# Patient Record
Sex: FEMALE | Race: OTHER | Employment: OTHER | URBAN - METROPOLITAN AREA
[De-identification: names, ages, dates, MRNs, and addresses within clinical notes are randomized per-mention and may not be internally consistent; named-entity substitution may affect disease eponyms.]

---

## 2018-01-05 PROBLEM — Z13.29 ENCOUNTER FOR SCREENING FOR ENDOCRINE DISORDER: Status: ACTIVE | Noted: 2018-01-05

## 2018-01-05 PROBLEM — Z00.00 MEDICARE ANNUAL WELLNESS VISIT, INITIAL: Status: ACTIVE | Noted: 2018-01-05

## 2018-01-05 PROBLEM — Z12.11 COLON CANCER SCREENING: Status: ACTIVE | Noted: 2018-01-05

## 2018-01-05 PROBLEM — K59.00 CONSTIPATION, UNSPECIFIED CONSTIPATION TYPE: Status: ACTIVE | Noted: 2018-01-05

## 2018-01-05 PROBLEM — E78.2 MIXED HYPERLIPIDEMIA: Status: ACTIVE | Noted: 2018-01-05

## 2018-01-17 PROBLEM — R19.4 CHANGE IN STOOL HABITS: Status: ACTIVE | Noted: 2018-01-17

## 2018-02-05 PROBLEM — Z12.31 VISIT FOR SCREENING MAMMOGRAM: Status: ACTIVE | Noted: 2018-02-05

## 2018-02-05 PROBLEM — Z78.0 POST-MENOPAUSAL: Status: ACTIVE | Noted: 2018-02-05

## 2018-02-26 PROBLEM — M25.511 ACUTE PAIN OF RIGHT SHOULDER: Status: ACTIVE | Noted: 2018-02-26

## 2018-02-26 PROBLEM — Z01.419 ENCOUNTER FOR WELL WOMAN EXAM WITH ROUTINE GYNECOLOGICAL EXAM: Status: ACTIVE | Noted: 2018-02-26

## 2018-03-07 PROBLEM — M75.41 IMPINGEMENT SYNDROME OF RIGHT SHOULDER: Status: ACTIVE | Noted: 2018-03-07

## 2018-03-16 PROBLEM — M54.2 CERVICALGIA: Status: ACTIVE | Noted: 2018-03-16

## 2019-02-06 PROBLEM — F41.9 ANXIETY: Status: ACTIVE | Noted: 2019-02-06

## 2019-02-06 PROBLEM — R19.4 CHANGE IN STOOL HABITS: Status: RESOLVED | Noted: 2018-01-17 | Resolved: 2019-02-06

## 2019-02-06 PROBLEM — M54.2 CERVICALGIA: Status: RESOLVED | Noted: 2018-03-16 | Resolved: 2019-02-06

## 2019-02-06 PROBLEM — M75.41 IMPINGEMENT SYNDROME OF RIGHT SHOULDER: Status: RESOLVED | Noted: 2018-03-07 | Resolved: 2019-02-06

## 2019-02-06 PROBLEM — M25.511 ACUTE PAIN OF RIGHT SHOULDER: Status: RESOLVED | Noted: 2018-02-26 | Resolved: 2019-02-06

## 2019-02-06 PROBLEM — K59.00 CONSTIPATION, UNSPECIFIED CONSTIPATION TYPE: Status: RESOLVED | Noted: 2018-01-05 | Resolved: 2019-02-06

## 2019-03-01 PROCEDURE — 88175 CYTOPATH C/V AUTO FLUID REDO: CPT | Performed by: OBSTETRICS & GYNECOLOGY

## 2019-03-06 PROBLEM — K40.90 RIGHT INGUINAL HERNIA: Status: ACTIVE | Noted: 2019-03-06

## 2019-03-06 PROBLEM — R73.9 HYPERGLYCEMIA: Status: ACTIVE | Noted: 2019-03-06

## 2019-11-05 PROBLEM — R73.01 IMPAIRED FASTING BLOOD SUGAR: Status: ACTIVE | Noted: 2019-11-05

## 2019-11-05 PROBLEM — R73.9 HYPERGLYCEMIA: Status: RESOLVED | Noted: 2019-03-06 | Resolved: 2019-11-05

## 2020-03-05 PROBLEM — Z78.0 POST-MENOPAUSE: Status: ACTIVE | Noted: 2018-02-05

## 2020-03-05 PROBLEM — Z00.00 MEDICARE ANNUAL WELLNESS VISIT, SUBSEQUENT: Status: ACTIVE | Noted: 2018-02-26

## 2020-09-03 PROBLEM — M25.511 CHRONIC RIGHT SHOULDER PAIN: Status: ACTIVE | Noted: 2020-09-03

## 2020-09-03 PROBLEM — G89.29 CHRONIC RIGHT SHOULDER PAIN: Status: ACTIVE | Noted: 2020-09-03

## 2020-09-03 PROBLEM — H61.21 CERUMEN DEBRIS ON TYMPANIC MEMBRANE OF RIGHT EAR: Status: ACTIVE | Noted: 2020-09-03

## 2020-09-23 PROBLEM — G95.20 SPINAL CORD COMPRESSION (HCC): Status: ACTIVE | Noted: 2020-09-23

## 2020-09-27 PROBLEM — E87.6 HYPOKALEMIA: Status: ACTIVE | Noted: 2020-09-27

## 2020-09-27 PROBLEM — R94.31 ABNORMAL ECG: Status: ACTIVE | Noted: 2020-09-27

## 2020-09-27 PROBLEM — R55 POSTURAL DIZZINESS WITH PRESYNCOPE: Status: ACTIVE | Noted: 2020-09-27

## 2020-09-27 PROBLEM — R93.89 ABNORMAL CHEST X-RAY: Status: ACTIVE | Noted: 2020-09-27

## 2020-09-27 PROBLEM — R42 POSTURAL DIZZINESS WITH PRESYNCOPE: Status: ACTIVE | Noted: 2020-09-27

## 2020-12-01 ENCOUNTER — OFFICE VISIT (OUTPATIENT)
Dept: SURGERY | Facility: CLINIC | Age: 70
End: 2020-12-01
Payer: MEDICARE

## 2020-12-01 VITALS — SYSTOLIC BLOOD PRESSURE: 140 MMHG | DIASTOLIC BLOOD PRESSURE: 70 MMHG | HEART RATE: 88 BPM

## 2020-12-01 DIAGNOSIS — G95.9 CERVICAL MYELOPATHY WITH CERVICAL RADICULOPATHY (HCC): Primary | ICD-10-CM

## 2020-12-01 DIAGNOSIS — R29.898 WEAKNESS OF BOTH LOWER EXTREMITIES: ICD-10-CM

## 2020-12-01 DIAGNOSIS — R26.9 NEUROLOGIC GAIT DYSFUNCTION: ICD-10-CM

## 2020-12-01 DIAGNOSIS — M54.12 CERVICAL MYELOPATHY WITH CERVICAL RADICULOPATHY (HCC): Primary | ICD-10-CM

## 2020-12-01 DIAGNOSIS — M47.816 LUMBAR SPONDYLOSIS: ICD-10-CM

## 2020-12-01 DIAGNOSIS — R29.898 WEAKNESS OF BOTH UPPER EXTREMITIES: ICD-10-CM

## 2020-12-01 DIAGNOSIS — D49.7 INTRADURAL EXTRAMEDULLARY SPINAL TUMOR: ICD-10-CM

## 2020-12-01 PROCEDURE — 3078F DIAST BP <80 MM HG: CPT | Performed by: PHYSICIAN ASSISTANT

## 2020-12-01 PROCEDURE — 3077F SYST BP >= 140 MM HG: CPT | Performed by: PHYSICIAN ASSISTANT

## 2020-12-01 PROCEDURE — 99205 OFFICE O/P NEW HI 60 MIN: CPT | Performed by: PHYSICIAN ASSISTANT

## 2020-12-01 NOTE — PATIENT INSTRUCTIONS
Refill policies:    • Allow 2-3 business days for refills; controlled substances may take longer.   • Contact your pharmacy at least 5 days prior to running out of medication and have them send an electronic request or submit request through the “request re Depending on your insurance carrier, approval may take 3-10 days. It is highly recommended patients contact their insurance carrier directly to determine coverage.   If test is done without insurance authorization, patient may be responsible for the entire without, MRI of the lumbar spine with and without gadolinium  Follow-up in 2 weeks  After imaging if the work-up is negative should benefit from a C3-4 laminectomy and tumor resection. Risk and benefits of surgery were discussed.   Patient was given ample

## 2020-12-01 NOTE — PROGRESS NOTES
Location of Pain: neck pain into shoulders bilaterally  and into the right arm. Pain is worse in the left shoulder and neck. Pt states she often drops things with her left hand.   Pt states she trips occasionally, and that she looses balance suddenly    D

## 2020-12-01 NOTE — PROGRESS NOTES
[facility] Neurosurgery Consultation      HISTORY OF PRESENT Brant Bradshaw is a 65year old  female who is primary Luxem[place]ish-speaking. She does speak some [place]. Her  here to translate for her they declined a .     She has a history HYSTERECTOMY         FAMILY HISTORY:  family history includes Diabetes in her sister; Hypertension in her sister. SOCIAL HISTORY:   reports that she has never smoked.  She has never used smokeless tobacco. She reports current alcohol use of about 1.0 sta associated dural tail or adjacent spinal cord edema. C3-C4: The intervertebral disc is normal. The right C3-C4 facet joint appears hypoplastic. C4-C5: A mild disc bulge and ligamentum flavum thickening cause mild central spinal canal stenosis.   Uncover [phone]

## 2020-12-02 ENCOUNTER — TELEPHONE (OUTPATIENT)
Dept: SURGERY | Facility: CLINIC | Age: 70
End: 2020-12-02

## 2020-12-15 ENCOUNTER — TELEPHONE (OUTPATIENT)
Dept: SURGERY | Facility: CLINIC | Age: 70
End: 2020-12-15

## 2020-12-15 DIAGNOSIS — R55 POSTURAL DIZZINESS WITH PRESYNCOPE: Primary | ICD-10-CM

## 2020-12-15 DIAGNOSIS — R42 POSTURAL DIZZINESS WITH PRESYNCOPE: Primary | ICD-10-CM

## 2020-12-15 NOTE — TELEPHONE ENCOUNTER
pt needs referrals to see Dr Devon Lockwood and Serena Dowling, please get referrals for pt, pt was in our office for appt and no referral in EPIC, pt asked to call DR Argentina Arellano office to get referral

## 2020-12-16 NOTE — TELEPHONE ENCOUNTER
Provider: Dr. Tariq Green and Lee Stauffer PA-C  Facility: [facility]  Address: Jeni Garay, [place], [address]  Phone: [phone]  Fax: [phone]  Routing to site for follow up. Thank you!

## 2020-12-18 PROBLEM — Q06.8: Status: ACTIVE | Noted: 2020-12-18

## 2020-12-21 PROBLEM — R93.2 ABNORMAL FINDING ON IMAGING OF LIVER: Status: ACTIVE | Noted: 2020-12-21

## 2020-12-21 PROBLEM — E87.6 HYPOKALEMIA: Status: RESOLVED | Noted: 2020-09-27 | Resolved: 2020-12-21

## 2020-12-21 PROBLEM — R93.7 ABNORMAL MAGNETIC RESONANCE IMAGING OF THORACIC SPINE: Status: ACTIVE | Noted: 2020-12-21

## 2020-12-21 PROBLEM — R93.89 ABNORMAL MRI, NECK: Status: ACTIVE | Noted: 2020-09-27

## 2020-12-21 PROBLEM — H61.21 CERUMEN DEBRIS ON TYMPANIC MEMBRANE OF RIGHT EAR: Status: RESOLVED | Noted: 2020-09-03 | Resolved: 2020-12-21

## 2021-01-14 PROBLEM — R10.11 RIGHT UPPER QUADRANT PAIN: Status: ACTIVE | Noted: 2021-01-14

## 2021-01-14 PROBLEM — K80.20 CALCULUS OF GALLBLADDER WITHOUT CHOLECYSTITIS WITHOUT OBSTRUCTION: Status: ACTIVE | Noted: 2021-01-14

## 2021-03-08 PROBLEM — R42 POSTURAL DIZZINESS WITH PRESYNCOPE: Status: RESOLVED | Noted: 2020-09-27 | Resolved: 2021-03-08

## 2021-03-08 PROBLEM — R55 POSTURAL DIZZINESS WITH PRESYNCOPE: Status: RESOLVED | Noted: 2020-09-27 | Resolved: 2021-03-08

## 2021-03-09 ENCOUNTER — OFFICE VISIT (OUTPATIENT)
Dept: SURGERY | Facility: CLINIC | Age: 71
End: 2021-03-09
Payer: MEDICARE

## 2021-03-09 VITALS
WEIGHT: 105 LBS | HEIGHT: 64 IN | SYSTOLIC BLOOD PRESSURE: 124 MMHG | DIASTOLIC BLOOD PRESSURE: 66 MMHG | HEART RATE: 62 BPM | BODY MASS INDEX: 17.93 KG/M2

## 2021-03-09 DIAGNOSIS — Q06.8: ICD-10-CM

## 2021-03-09 DIAGNOSIS — G95.20 SPINAL CORD COMPRESSION (HCC): Primary | ICD-10-CM

## 2021-03-09 PROCEDURE — 3008F BODY MASS INDEX DOCD: CPT | Performed by: NEUROLOGICAL SURGERY

## 2021-03-09 PROCEDURE — 3074F SYST BP LT 130 MM HG: CPT | Performed by: NEUROLOGICAL SURGERY

## 2021-03-09 PROCEDURE — 99214 OFFICE O/P EST MOD 30 MIN: CPT | Performed by: NEUROLOGICAL SURGERY

## 2021-03-09 PROCEDURE — 3078F DIAST BP <80 MM HG: CPT | Performed by: NEUROLOGICAL SURGERY

## 2021-03-09 NOTE — PROGRESS NOTES
Patient here for follow-up and imaging review. Patient with continued bilateral shoulder and neck pain. Rated 9/10 at worst. Pain is worse in the morning, gets better, up to 8/10 as day goes on.

## 2021-03-09 NOTE — PROGRESS NOTES
Neurosurgery Clinic Visit  3/9/2021    Sariah Price PCP:  Laverne Storm MD    3/3/1950 MRN [id number]     HISTORY OF PRESENT ILLNESS:  Sariah Price is a(n) 66year old female who is here for follow-up.   Video translation was used throughout the visi updated imaging (MRI cervical spine, CT cervical spine), as well as full set of cervical xrays for surgical planning. Once these are completed, we can discuss surgical options. She will RTC once imaging is completed. Pt and  were appreciative.

## 2021-04-15 ENCOUNTER — HOSPITAL ENCOUNTER (OUTPATIENT)
Dept: MRI IMAGING | Facility: HOSPITAL | Age: 71
Discharge: HOME | End: 2021-04-15
Attending: PHYSICIAN ASSISTANT
Payer: MEDICARE

## 2021-04-15 DIAGNOSIS — Q06.8: ICD-10-CM

## 2021-04-15 DIAGNOSIS — G95.20 SPINAL CORD COMPRESSION (HCC): ICD-10-CM

## 2021-04-15 PROCEDURE — 72156 MRI NECK SPINE W/O & W/DYE: CPT | Performed by: PHYSICIAN ASSISTANT

## 2021-04-15 PROCEDURE — A9575 INJ GADOTERATE MEGLUMI 0.1ML: HCPCS | Performed by: PHYSICIAN ASSISTANT

## 2021-04-20 ENCOUNTER — OFFICE VISIT (OUTPATIENT)
Dept: SURGERY | Facility: CLINIC | Age: 71
End: 2021-04-20
Payer: MEDICARE

## 2021-04-20 ENCOUNTER — TELEPHONE (OUTPATIENT)
Dept: SURGERY | Facility: CLINIC | Age: 71
End: 2021-04-20

## 2021-04-20 VITALS
SYSTOLIC BLOOD PRESSURE: 140 MMHG | BODY MASS INDEX: 17.93 KG/M2 | HEIGHT: 64 IN | WEIGHT: 105 LBS | DIASTOLIC BLOOD PRESSURE: 80 MMHG

## 2021-04-20 DIAGNOSIS — R29.898 WEAKNESS OF BOTH UPPER EXTREMITIES: ICD-10-CM

## 2021-04-20 DIAGNOSIS — D49.2 CERVICAL SPINE TUMOR: ICD-10-CM

## 2021-04-20 DIAGNOSIS — G95.20 SPINAL CORD COMPRESSION (HCC): Primary | ICD-10-CM

## 2021-04-20 DIAGNOSIS — D49.7 INTRADURAL EXTRAMEDULLARY SPINAL TUMOR: ICD-10-CM

## 2021-04-20 DIAGNOSIS — G95.9 CERVICAL MYELOPATHY WITH CERVICAL RADICULOPATHY (HCC): ICD-10-CM

## 2021-04-20 DIAGNOSIS — M54.12 CERVICAL MYELOPATHY WITH CERVICAL RADICULOPATHY (HCC): ICD-10-CM

## 2021-04-20 DIAGNOSIS — Q06.8: ICD-10-CM

## 2021-04-20 PROCEDURE — 3077F SYST BP >= 140 MM HG: CPT | Performed by: NEUROLOGICAL SURGERY

## 2021-04-20 PROCEDURE — 99213 OFFICE O/P EST LOW 20 MIN: CPT | Performed by: NEUROLOGICAL SURGERY

## 2021-04-20 PROCEDURE — 3079F DIAST BP 80-89 MM HG: CPT | Performed by: NEUROLOGICAL SURGERY

## 2021-04-20 PROCEDURE — 3008F BODY MASS INDEX DOCD: CPT | Performed by: NEUROLOGICAL SURGERY

## 2021-04-20 NOTE — PATIENT INSTRUCTIONS
Refill policies:    • Allow 2-3 business days for refills; controlled substances may take longer.   • Contact your pharmacy at least 5 days prior to running out of medication and have them send an electronic request or submit request through the “request re Depending on your insurance carrier, approval may take 3-10 days. It is highly recommended patients contact their insurance carrier directly to determine coverage.   If test is done without insurance authorization, patient may be responsible for the entire RESECTION OF INTRADURAL TUMOR on 5/26/21 with . Pre-op instructions discussed with patient and surgical packet provided:    · You will need to contact the Pre-admission department at [phone] to schedule your pre-op testing.   They will ge ordered for your surgery [employer] rep- Rogerio Daniels will contact you either before or after your surgery. For any questions you may reach Rogerio Daniels at phone #: [phone]. · You may also need SCD's (Sequential Compression Device) to use after surgery.  This device wraps

## 2021-04-20 NOTE — PROGRESS NOTES
Not feeling well  She is in more pain than when she was here last  Pain is shoulders and neck  Getting dizzy on occasion  Feels off balance    Pain is upper in shoulders across back and neck

## 2021-04-20 NOTE — PROGRESS NOTES
Neurosurgery Clinic Visit  2021    Dari Green PCP:  Laina Chisholm MD    3/3/1950 MRN [id number]     HISTORY OF PRESENT ILLNESS:  Dari Green is a(n) 66year old female who is here for follow-up for cervical tumor.   Since her last visit, she evaluated the patient and is in agreement with the plan. Total time spent: 15 Minutes  Greater than 50% of the time was spent on counseling/coordination of care. Nature of education / counseling: Pathology, treatment options, and expected outcomes.

## 2021-04-20 NOTE — H&P
Neurosurgery Clinic Visit  2021    Ramón Ross PCP:  Ysabel Thomas MD    3/3/1950 MRN [id number]     HISTORY OF PRESENT ILLNESS:  Ramón Ross is a(n) 66year old female who is here for follow-up for cervical tumor.   Since her last visit, she evaluated the patient and is in agreement with the plan. Total time spent: 15 Minutes  Greater than 50% of the time was spent on counseling/coordination of care. Nature of education / counseling: Pathology, treatment options, and expected outcomes.

## 2021-04-21 RX ORDER — ACETAMINOPHEN 500 MG
1000 TABLET ORAL ONCE
Status: CANCELLED | OUTPATIENT
Start: 2021-04-21 | End: 2021-04-21

## 2021-04-21 NOTE — TELEPHONE ENCOUNTER
Patient is scheduled for POSTERIOR CERVICAL 3 - CERVICAL 4 LAMINECTOMIES AND OTHER LEVELS AS INDICATED WITH RESECTION OF INTRADURAL TUMOR, NEUROMONITORING on 5/26/21 with Dr Manasa Marie.     X Surgery order signed   X Placed sx on surgery sheet  X Placed on ou

## 2021-04-21 NOTE — TELEPHONE ENCOUNTER
You are scheduled for POSTERIOR CERVICAL 3 - CERVICAL 4 LAMINECTOMIES AND OTHER LEVELS AS INDICATED WITH RESECTION OF INTRADURAL TUMOR, NEUROMONITORING on 5/26/21 with .     Pre-op instructions discussed with patient and surgical packet provided: 23 hour admission. · The hospital will contact you 1-2 days before surgery with your arrival time. · PCP clearance may be needed, please contact their office for appointment. · You may need an Aspen cervical collar.      · You may need an external

## 2021-04-21 NOTE — TELEPHONE ENCOUNTER
Future Appointments   Date Time Provider Allison Monique   5/6/2021 11:00 AM MD DARLIN Thomas [facility]   6/11/2021 12:00 PM HELEN Samuels [facility] [place]   7/8/2021 11:00 AM MD DARLIN Thomas IM [facility] MICHA   7/8/2021  1:00 PM

## 2021-05-04 NOTE — TELEPHONE ENCOUNTER
Received call from Brittany Zambrano at [facility] notifying that PA for surgery has not been received, advised Brittany Zambrano that patient is scheduled for surgery on 05/26/21 and PA process will be started this week.

## 2021-05-07 PROBLEM — Z01.818 PREOPERATIVE EXAMINATION: Status: ACTIVE | Noted: 2021-05-07

## 2021-05-07 NOTE — TELEPHONE ENCOUNTER
Called pt to reschedule sx and offer sx date 6/9/21     [facility]     Will try to reach out to pt again at later time

## 2021-05-10 NOTE — TELEPHONE ENCOUNTER
Received return phone call from patient's  Marlo Thomas. Per Marlo Thomas, patient agreed to new surgery date of 6/9/21, but would still like to be notified if a sooner date becomes available.   Explained to patient that Dr. Jan Samuel is no longer available for

## 2021-05-10 NOTE — TELEPHONE ENCOUNTER
Called patient/ and LMTCB regarding his accepting new surgery date of 6/9/21 which had been requested by Dr. Mac Gutierrez as he is now unavailable on 5/26/21.

## 2021-05-10 NOTE — TELEPHONE ENCOUNTER
Called patient to discuss new date that has been offered by Dr. Donita Marquez. No answer, no option to leave message now, as voicemail is full. Obtained daughter Donna's contact information in \"verbal\" tab. Reached voicemail and LMTCB.

## 2021-05-11 NOTE — TELEPHONE ENCOUNTER
Per DEE Bonilla:  Dr. Tati Nur to have DAYAN Almodovar to assist surgery. [place] calendar updated.

## 2021-05-17 NOTE — TELEPHONE ENCOUNTER
Per pt PCP Dr. Kayla Shin 5/7/21    \"ASSESSMENT AND PLAN  1.  Preoperative examination  See problem list  Medications reviewed as pertains to this diagnosis  Appropriated follow up discussed and orders left to schedule the same  Lab results discussed in depth a

## 2021-05-21 NOTE — TELEPHONE ENCOUNTER
Prior Authorization for inpatient surgery initiated with [employer] form   Requesting coverage for:  POSTERIOR CERVICAL LAMINECTOMY FORAMINOTOMY 1 LEV  Date of Service: 6/9/2021   Inpatient days requested:1  CPT codes: 82604,79667    Request for surgery pend

## 2021-05-24 NOTE — TELEPHONE ENCOUNTER
Prior authorization request completed for:    POSTERIOR CERVICAL LAMINECTOMY FORAMINOTOMY 1 [facility]    Authorization #[id number]  Authorization dates: 6/9/21  CPT codes approved: 11746 and 61316  Number of visits/dates of service approved: 1  Physician:

## 2021-06-03 NOTE — TELEPHONE ENCOUNTER
Per pt PCP Dr. Yash Hyatt 6/3/21    \"FRAZIER'S REVISED CARDIAC RISK INDEX:     1. HIGH RISK SURGERY-  VASCULAR, INTRATHORACIC  2. HX OF ISCHEMIC HEART DZ-  MI, POSITIVE STRESS,  Q WAVES, ANGINA  3. HX OF CHF  4. HX CVA  5.   DM WITH INSULIN  6

## 2021-06-06 ENCOUNTER — LAB ENCOUNTER (OUTPATIENT)
Dept: LAB | Facility: HOSPITAL | Age: 71
DRG: 982 | End: 2021-06-06
Attending: NEUROLOGICAL SURGERY
Payer: MEDICARE

## 2021-06-06 DIAGNOSIS — G95.20 SPINAL CORD COMPRESSION (HCC): ICD-10-CM

## 2021-06-06 PROCEDURE — 86850 RBC ANTIBODY SCREEN: CPT

## 2021-06-06 PROCEDURE — 86901 BLOOD TYPING SEROLOGIC RH(D): CPT

## 2021-06-06 PROCEDURE — 86900 BLOOD TYPING SEROLOGIC ABO: CPT

## 2021-06-09 ENCOUNTER — HOSPITAL ENCOUNTER (OUTPATIENT)
Dept: ULTRASOUND IMAGING | Facility: HOSPITAL | Age: 71
Discharge: HOME | DRG: 982 | End: 2021-06-09
Attending: NEUROLOGICAL SURGERY
Payer: MEDICARE

## 2021-06-09 ENCOUNTER — HOSPITAL ENCOUNTER (INPATIENT)
Facility: HOSPITAL | Age: 71
LOS: 2 days | Discharge: HOME | DRG: 982 | End: 2021-06-11
Attending: NEUROLOGICAL SURGERY | Admitting: NEUROLOGICAL SURGERY
Payer: MEDICARE

## 2021-06-09 ENCOUNTER — APPOINTMENT (OUTPATIENT)
Dept: GENERAL RADIOLOGY | Facility: HOSPITAL | Age: 71
DRG: 982 | End: 2021-06-09
Attending: NEUROLOGICAL SURGERY
Payer: MEDICARE

## 2021-06-09 ENCOUNTER — ANESTHESIA EVENT (OUTPATIENT)
Dept: SURGERY | Facility: HOSPITAL | Age: 71
DRG: 982 | End: 2021-06-09
Payer: MEDICARE

## 2021-06-09 ENCOUNTER — ANESTHESIA (OUTPATIENT)
Dept: SURGERY | Facility: HOSPITAL | Age: 71
DRG: 982 | End: 2021-06-09
Payer: MEDICARE

## 2021-06-09 DIAGNOSIS — M54.12 CERVICAL MYELOPATHY WITH CERVICAL RADICULOPATHY (HCC): ICD-10-CM

## 2021-06-09 DIAGNOSIS — R29.898 WEAKNESS OF BOTH UPPER EXTREMITIES: ICD-10-CM

## 2021-06-09 DIAGNOSIS — G95.20 SPINAL CORD COMPRESSION (HCC): ICD-10-CM

## 2021-06-09 DIAGNOSIS — G95.20 SPINAL CORD COMPRESSION (HCC): Primary | ICD-10-CM

## 2021-06-09 DIAGNOSIS — Q06.8: ICD-10-CM

## 2021-06-09 DIAGNOSIS — G95.9 CERVICAL MYELOPATHY WITH CERVICAL RADICULOPATHY (HCC): ICD-10-CM

## 2021-06-09 DIAGNOSIS — D49.7 INTRADURAL EXTRAMEDULLARY SPINAL TUMOR: ICD-10-CM

## 2021-06-09 DIAGNOSIS — D49.2 CERVICAL SPINE TUMOR: ICD-10-CM

## 2021-06-09 PROCEDURE — 88341 IMHCHEM/IMCYTCHM EA ADD ANTB: CPT

## 2021-06-09 PROCEDURE — 88313 SPECIAL STAINS GROUP 2: CPT

## 2021-06-09 PROCEDURE — 4A11X4G MONITORING OF PERIPHERAL NERVOUS ELECTRICAL ACTIVITY, INTRAOPERATIVE, EXTERNAL APPROACH: ICD-10-PCS | Performed by: NEUROLOGICAL SURGERY

## 2021-06-09 PROCEDURE — 00BW0ZX EXCISION OF CERVICAL SPINAL CORD, OPEN APPROACH, DIAGNOSTIC: ICD-10-PCS | Performed by: NEUROLOGICAL SURGERY

## 2021-06-09 PROCEDURE — 76998 US GUIDE INTRAOP: CPT | Performed by: NEUROLOGICAL SURGERY

## 2021-06-09 PROCEDURE — 76000 FLUOROSCOPY <1 HR PHYS/QHP: CPT | Performed by: NEUROLOGICAL SURGERY

## 2021-06-09 DEVICE — KIT TISS CLOS TISSEEL 4ML: Type: IMPLANTABLE DEVICE | Site: NECK | Status: FUNCTIONAL

## 2021-06-09 RX ORDER — HYDROCODONE BITARTRATE AND ACETAMINOPHEN 10; 325 MG/1; MG/1
2 TABLET ORAL EVERY 4 HOURS PRN
Status: DISCONTINUED | OUTPATIENT
Start: 2021-06-09 | End: 2021-06-11

## 2021-06-09 RX ORDER — SODIUM PHOSPHATE, DIBASIC AND SODIUM PHOSPHATE, MONOBASIC 7; 19 G/133ML; G/133ML
1 ENEMA RECTAL ONCE AS NEEDED
Status: DISCONTINUED | OUTPATIENT
Start: 2021-06-09 | End: 2021-06-11

## 2021-06-09 RX ORDER — ERGOCALCIFEROL 1.25 MG/1
50000 CAPSULE ORAL WEEKLY
Status: DISCONTINUED | OUTPATIENT
Start: 2021-06-09 | End: 2021-06-11

## 2021-06-09 RX ORDER — SODIUM CHLORIDE AND POTASSIUM CHLORIDE .9; .15 G/100ML; G/100ML
75 SOLUTION INTRAVENOUS CONTINUOUS
Status: DISCONTINUED | OUTPATIENT
Start: 2021-06-09 | End: 2021-06-11

## 2021-06-09 RX ORDER — ROCURONIUM BROMIDE 10 MG/ML
INJECTION, SOLUTION INTRAVENOUS AS NEEDED
Status: DISCONTINUED | OUTPATIENT
Start: 2021-06-09 | End: 2021-06-09 | Stop reason: SURG

## 2021-06-09 RX ORDER — PRAVASTATIN SODIUM 20 MG
20 TABLET ORAL DAILY
Status: DISCONTINUED | OUTPATIENT
Start: 2021-06-09 | End: 2021-06-11

## 2021-06-09 RX ORDER — SODIUM CHLORIDE, SODIUM LACTATE, POTASSIUM CHLORIDE, CALCIUM CHLORIDE 600; 310; 30; 20 MG/100ML; MG/100ML; MG/100ML; MG/100ML
INJECTION, SOLUTION INTRAVENOUS CONTINUOUS
Status: DISCONTINUED | OUTPATIENT
Start: 2021-06-09 | End: 2021-06-09

## 2021-06-09 RX ORDER — MIDAZOLAM HYDROCHLORIDE 1 MG/ML
1 INJECTION INTRAMUSCULAR; INTRAVENOUS EVERY 5 MIN PRN
Status: DISCONTINUED | OUTPATIENT
Start: 2021-06-09 | End: 2021-06-09 | Stop reason: HOSPADM

## 2021-06-09 RX ORDER — LIDOCAINE 50 MG/G
1 OINTMENT TOPICAL 3 TIMES DAILY
Status: DISCONTINUED | OUTPATIENT
Start: 2021-06-09 | End: 2021-06-11

## 2021-06-09 RX ORDER — MULTIPLE VITAMINS W/ MINERALS TAB 9MG-400MCG
1 TAB ORAL DAILY
Status: DISCONTINUED | OUTPATIENT
Start: 2021-06-09 | End: 2021-06-11

## 2021-06-09 RX ORDER — HYDROMORPHONE HYDROCHLORIDE 1 MG/ML
0.4 INJECTION, SOLUTION INTRAMUSCULAR; INTRAVENOUS; SUBCUTANEOUS EVERY 2 HOUR PRN
Status: DISCONTINUED | OUTPATIENT
Start: 2021-06-09 | End: 2021-06-11

## 2021-06-09 RX ORDER — SCOLOPAMINE TRANSDERMAL SYSTEM 1 MG/1
1 PATCH, EXTENDED RELEASE TRANSDERMAL
Status: DISCONTINUED | OUTPATIENT
Start: 2021-06-09 | End: 2021-06-09 | Stop reason: HOSPADM

## 2021-06-09 RX ORDER — DIPHENHYDRAMINE HYDROCHLORIDE 50 MG/ML
25 INJECTION INTRAMUSCULAR; INTRAVENOUS EVERY 4 HOURS PRN
Status: DISCONTINUED | OUTPATIENT
Start: 2021-06-09 | End: 2021-06-11

## 2021-06-09 RX ORDER — CYCLOBENZAPRINE HCL 10 MG
10 TABLET ORAL EVERY 6 HOURS PRN
Status: DISCONTINUED | OUTPATIENT
Start: 2021-06-09 | End: 2021-06-11

## 2021-06-09 RX ORDER — DIPHENHYDRAMINE HCL 25 MG
25 CAPSULE ORAL EVERY 4 HOURS PRN
Status: DISCONTINUED | OUTPATIENT
Start: 2021-06-09 | End: 2021-06-11

## 2021-06-09 RX ORDER — HYDROMORPHONE HYDROCHLORIDE 1 MG/ML
INJECTION, SOLUTION INTRAMUSCULAR; INTRAVENOUS; SUBCUTANEOUS
Status: COMPLETED
Start: 2021-06-09 | End: 2021-06-09

## 2021-06-09 RX ORDER — POTASSIUM CHLORIDE 14.9 MG/ML
20 INJECTION INTRAVENOUS ONCE
Status: COMPLETED | OUTPATIENT
Start: 2021-06-10 | End: 2021-06-10

## 2021-06-09 RX ORDER — METOCLOPRAMIDE HYDROCHLORIDE 5 MG/ML
INJECTION INTRAMUSCULAR; INTRAVENOUS
Status: COMPLETED
Start: 2021-06-09 | End: 2021-06-09

## 2021-06-09 RX ORDER — MEPERIDINE HYDROCHLORIDE 25 MG/ML
INJECTION INTRAMUSCULAR; INTRAVENOUS; SUBCUTANEOUS
Status: COMPLETED
Start: 2021-06-09 | End: 2021-06-09

## 2021-06-09 RX ORDER — SODIUM CHLORIDE, SODIUM LACTATE, POTASSIUM CHLORIDE, CALCIUM CHLORIDE 600; 310; 30; 20 MG/100ML; MG/100ML; MG/100ML; MG/100ML
INJECTION, SOLUTION INTRAVENOUS CONTINUOUS
Status: DISCONTINUED | OUTPATIENT
Start: 2021-06-09 | End: 2021-06-09 | Stop reason: HOSPADM

## 2021-06-09 RX ORDER — HYDROMORPHONE HYDROCHLORIDE 1 MG/ML
0.4 INJECTION, SOLUTION INTRAMUSCULAR; INTRAVENOUS; SUBCUTANEOUS EVERY 5 MIN PRN
Status: DISCONTINUED | OUTPATIENT
Start: 2021-06-09 | End: 2021-06-09 | Stop reason: HOSPADM

## 2021-06-09 RX ORDER — ONDANSETRON 2 MG/ML
4 INJECTION INTRAMUSCULAR; INTRAVENOUS EVERY 6 HOURS PRN
Status: DISCONTINUED | OUTPATIENT
Start: 2021-06-09 | End: 2021-06-11

## 2021-06-09 RX ORDER — ACETAMINOPHEN 325 MG/1
650 TABLET ORAL EVERY 4 HOURS PRN
Status: DISCONTINUED | OUTPATIENT
Start: 2021-06-09 | End: 2021-06-11

## 2021-06-09 RX ORDER — ONDANSETRON 2 MG/ML
4 INJECTION INTRAMUSCULAR; INTRAVENOUS EVERY 4 HOURS PRN
Status: DISCONTINUED | OUTPATIENT
Start: 2021-06-09 | End: 2021-06-09

## 2021-06-09 RX ORDER — BISACODYL 10 MG
10 SUPPOSITORY, RECTAL RECTAL
Status: DISCONTINUED | OUTPATIENT
Start: 2021-06-09 | End: 2021-06-11

## 2021-06-09 RX ORDER — CEFAZOLIN SODIUM/WATER 2 G/20 ML
2 SYRINGE (ML) INTRAVENOUS ONCE
Status: COMPLETED | OUTPATIENT
Start: 2021-06-09 | End: 2021-06-09

## 2021-06-09 RX ORDER — ONDANSETRON 2 MG/ML
INJECTION INTRAMUSCULAR; INTRAVENOUS
Status: COMPLETED
Start: 2021-06-09 | End: 2021-06-09

## 2021-06-09 RX ORDER — SCOLOPAMINE TRANSDERMAL SYSTEM 1 MG/1
PATCH, EXTENDED RELEASE TRANSDERMAL
Status: COMPLETED
Start: 2021-06-09 | End: 2021-06-12

## 2021-06-09 RX ORDER — METOCLOPRAMIDE HYDROCHLORIDE 5 MG/ML
10 INJECTION INTRAMUSCULAR; INTRAVENOUS EVERY 6 HOURS PRN
Status: DISCONTINUED | OUTPATIENT
Start: 2021-06-09 | End: 2021-06-11

## 2021-06-09 RX ORDER — ONDANSETRON 2 MG/ML
4 INJECTION INTRAMUSCULAR; INTRAVENOUS AS NEEDED
Status: DISCONTINUED | OUTPATIENT
Start: 2021-06-09 | End: 2021-06-09 | Stop reason: HOSPADM

## 2021-06-09 RX ORDER — MEPERIDINE HYDROCHLORIDE 25 MG/ML
12.5 INJECTION INTRAMUSCULAR; INTRAVENOUS; SUBCUTANEOUS AS NEEDED
Status: COMPLETED | OUTPATIENT
Start: 2021-06-09 | End: 2021-06-09

## 2021-06-09 RX ORDER — HYDROMORPHONE HYDROCHLORIDE 1 MG/ML
0.8 INJECTION, SOLUTION INTRAMUSCULAR; INTRAVENOUS; SUBCUTANEOUS EVERY 2 HOUR PRN
Status: DISCONTINUED | OUTPATIENT
Start: 2021-06-09 | End: 2021-06-11

## 2021-06-09 RX ORDER — ONDANSETRON 2 MG/ML
INJECTION INTRAMUSCULAR; INTRAVENOUS AS NEEDED
Status: DISCONTINUED | OUTPATIENT
Start: 2021-06-09 | End: 2021-06-09 | Stop reason: SURG

## 2021-06-09 RX ORDER — HYDROMORPHONE HYDROCHLORIDE 1 MG/ML
0.2 INJECTION, SOLUTION INTRAMUSCULAR; INTRAVENOUS; SUBCUTANEOUS EVERY 2 HOUR PRN
Status: DISCONTINUED | OUTPATIENT
Start: 2021-06-09 | End: 2021-06-11

## 2021-06-09 RX ORDER — BUPIVACAINE HYDROCHLORIDE AND EPINEPHRINE 2.5; 5 MG/ML; UG/ML
INJECTION, SOLUTION EPIDURAL; INFILTRATION; INTRACAUDAL; PERINEURAL AS NEEDED
Status: DISCONTINUED | OUTPATIENT
Start: 2021-06-09 | End: 2021-06-09 | Stop reason: HOSPADM

## 2021-06-09 RX ORDER — DOCUSATE SODIUM 100 MG/1
100 CAPSULE, LIQUID FILLED ORAL 2 TIMES DAILY
Status: DISCONTINUED | OUTPATIENT
Start: 2021-06-09 | End: 2021-06-11

## 2021-06-09 RX ORDER — CEFAZOLIN SODIUM/WATER 2 G/20 ML
2 SYRINGE (ML) INTRAVENOUS EVERY 8 HOURS
Status: COMPLETED | OUTPATIENT
Start: 2021-06-09 | End: 2021-06-10

## 2021-06-09 RX ORDER — PROCHLORPERAZINE EDISYLATE 5 MG/ML
10 INJECTION INTRAMUSCULAR; INTRAVENOUS EVERY 6 HOURS PRN
Status: DISCONTINUED | OUTPATIENT
Start: 2021-06-09 | End: 2021-06-11

## 2021-06-09 RX ORDER — METOCLOPRAMIDE HYDROCHLORIDE 5 MG/ML
10 INJECTION INTRAMUSCULAR; INTRAVENOUS AS NEEDED
Status: DISCONTINUED | OUTPATIENT
Start: 2021-06-09 | End: 2021-06-09 | Stop reason: HOSPADM

## 2021-06-09 RX ORDER — DIAZEPAM 5 MG/1
5 TABLET ORAL EVERY 6 HOURS PRN
Status: DISCONTINUED | OUTPATIENT
Start: 2021-06-09 | End: 2021-06-11

## 2021-06-09 RX ORDER — NALOXONE HYDROCHLORIDE 0.4 MG/ML
80 INJECTION, SOLUTION INTRAMUSCULAR; INTRAVENOUS; SUBCUTANEOUS AS NEEDED
Status: DISCONTINUED | OUTPATIENT
Start: 2021-06-09 | End: 2021-06-09 | Stop reason: HOSPADM

## 2021-06-09 RX ORDER — DEXAMETHASONE SODIUM PHOSPHATE 4 MG/ML
VIAL (ML) INJECTION AS NEEDED
Status: DISCONTINUED | OUTPATIENT
Start: 2021-06-09 | End: 2021-06-09 | Stop reason: SURG

## 2021-06-09 RX ORDER — PROCHLORPERAZINE EDISYLATE 5 MG/ML
10 INJECTION INTRAMUSCULAR; INTRAVENOUS EVERY 6 HOURS PRN
Status: DISCONTINUED | OUTPATIENT
Start: 2021-06-09 | End: 2021-06-09

## 2021-06-09 RX ORDER — SENNOSIDES 8.6 MG
17.2 TABLET ORAL NIGHTLY
Status: DISCONTINUED | OUTPATIENT
Start: 2021-06-09 | End: 2021-06-11

## 2021-06-09 RX ORDER — HYDROCODONE BITARTRATE AND ACETAMINOPHEN 10; 325 MG/1; MG/1
1 TABLET ORAL EVERY 4 HOURS PRN
Status: DISCONTINUED | OUTPATIENT
Start: 2021-06-09 | End: 2021-06-11

## 2021-06-09 RX ORDER — METOCLOPRAMIDE HYDROCHLORIDE 5 MG/ML
INJECTION INTRAMUSCULAR; INTRAVENOUS AS NEEDED
Status: DISCONTINUED | OUTPATIENT
Start: 2021-06-09 | End: 2021-06-09 | Stop reason: SURG

## 2021-06-09 RX ORDER — POLYETHYLENE GLYCOL 3350 17 G/17G
17 POWDER, FOR SOLUTION ORAL DAILY PRN
Status: DISCONTINUED | OUTPATIENT
Start: 2021-06-09 | End: 2021-06-11

## 2021-06-09 RX ADMIN — METOCLOPRAMIDE HYDROCHLORIDE 10 MG: 5 INJECTION INTRAMUSCULAR; INTRAVENOUS at 19:12:00

## 2021-06-09 RX ADMIN — DEXAMETHASONE SODIUM PHOSPHATE 8 MG: 4 MG/ML VIAL (ML) INJECTION at 19:12:00

## 2021-06-09 RX ADMIN — ONDANSETRON 4 MG: 2 INJECTION INTRAMUSCULAR; INTRAVENOUS at 20:24:00

## 2021-06-09 RX ADMIN — ROCURONIUM BROMIDE 3 MG: 10 INJECTION, SOLUTION INTRAVENOUS at 17:50:00

## 2021-06-09 RX ADMIN — SODIUM CHLORIDE, SODIUM LACTATE, POTASSIUM CHLORIDE, CALCIUM CHLORIDE: 600; 310; 30; 20 INJECTION, SOLUTION INTRAVENOUS at 21:04:00

## 2021-06-09 RX ADMIN — CEFAZOLIN SODIUM/WATER 2 G: 2 G/20 ML SYRINGE (ML) INTRAVENOUS at 18:00:00

## 2021-06-09 NOTE — ANESTHESIA PROCEDURE NOTES
Airway  Urgency: elective    Airway not difficult    General Information and Staff    Patient location during procedure: OR  Anesthesiologist: Oneal Goldsmith MD  Performed: anesthesiologist     Indications and Patient Condition  Indications for airway geovanna

## 2021-06-09 NOTE — H&P
Brandon Serrano PCP:  Clinton Osborn MD    3/3/1950 MRN [id number]      HISTORY OF PRESENT ILLNESS:  Brandon Serrano is a(n) 66year old female who is here for follow-up for cervical tumor.  Since her last visit, she has been getting more dizziness and cony agreement with the plan.     No changes  Ready for sdurgery  All ?'s answered  Pt seen an dexamined

## 2021-06-10 ENCOUNTER — APPOINTMENT (OUTPATIENT)
Dept: CV DIAGNOSTICS | Facility: HOSPITAL | Age: 71
DRG: 982 | End: 2021-06-10
Attending: INTERNAL MEDICINE
Payer: MEDICARE

## 2021-06-10 PROCEDURE — 99291 CRITICAL CARE FIRST HOUR: CPT | Performed by: OTHER

## 2021-06-10 PROCEDURE — 93306 TTE W/DOPPLER COMPLETE: CPT | Performed by: INTERNAL MEDICINE

## 2021-06-10 PROCEDURE — 99291 CRITICAL CARE FIRST HOUR: CPT | Performed by: NURSE PRACTITIONER

## 2021-06-10 RX ORDER — DEXAMETHASONE 4 MG/1
4 TABLET ORAL EVERY 8 HOURS SCHEDULED
Status: DISCONTINUED | OUTPATIENT
Start: 2021-06-10 | End: 2021-06-11

## 2021-06-10 RX ORDER — DILTIAZEM HYDROCHLORIDE 5 MG/ML
10 INJECTION INTRAVENOUS ONCE
Status: COMPLETED | OUTPATIENT
Start: 2021-06-10 | End: 2021-06-10

## 2021-06-10 NOTE — PROGRESS NOTES
Assumed care of pt at 1310. Pt resting comfortably in bed. VS stable on room air. Tele-NSR. Denies nausea. Oriented to room. Spouse bedside. Instructed to call for assistance. Will continue to monitor.

## 2021-06-10 NOTE — CONSULTS
[facility] Cardiology Consultation Cassidy Howard MD    The patient was interviewed, examined, the chart was reviewed and the consult was dictated. This is a 66year old female with a chief complaint of increased heart rate. Impression:  1.   Postop paroxysma

## 2021-06-10 NOTE — CONSULTS
[facility]    PATIENT'S NAME: Giselle Zhu PHYSICIAN: Gary Wood M.D.   CONSULTING PHYSICIAN: Samir Martinez M.D.    PATIENT ACCOUNT#:   [de-identified]    LOCATION:  [address] [place] [address]  MEDICAL RECORD #:   [id number]       DATE OF BI NECK:  Supple. LUNGS:  Thorax clear to auscultation bilaterally. HEART:  No jugular venous distention, carotid bruit, third heart sound, or murmur. ABDOMEN:  Scaphoid, nontender. EXTREMITIES:  Without clubbing, cyanosis, or significant weakness.

## 2021-06-10 NOTE — ANESTHESIA PREPROCEDURE EVALUATION
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 7 at 1100  Naproxen Sodium (ALEVE) 220 MG Oral Cap, Take 220 mg by mouth daily as needed. , Disp: , Rfl: , 5/21/2021  ASPERCREME W/LIDOCAINE 4 % External Cream, Apply 1 Application topically 3 (three) times daily.  As directed, Disp: 1 Tube, Rfl: 0, Past Mon Value Date     05/06/2021    K 4.11 05/06/2021     05/06/2021    CO2 27.5 05/06/2021    BUN 16.0 05/06/2021    CREATSERUM 0.67 05/06/2021     05/06/2021    CA 9.6 05/06/2021            Airway      Mallampati: II  Mouth opening: 3 FB  TM

## 2021-06-10 NOTE — OPERATIVE REPORT
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 The laminectomy was performed using Kerrison rongeurs and a trough fashion. Once this was complete, we used the ultrasound to confirm the tumor was in our operative site. We then placed 4-0 Nurolon's in the dura.   The

## 2021-06-10 NOTE — PROGRESS NOTES
[facility]  Neurosurgery Progress Note    Tirso Sharp Patient Status:  Inpatient    3/3/1950 MRN [id number]   [facility] 6NE-A Attending Princess Subramanian MD   Hosp Day # 1 PCP Paty Mcgee MD     Chief Complaint:  No chief comp laminectomies for resection of intradural extramedullary tumor POD #1    Plan:   Will discuss with Dr. Princess Betsy LARIOS to transfer to floor from neurosurgical standpoint  PT/OT consults  Pain medications as ordered  Medical management per hospitalist  DVT prop

## 2021-06-10 NOTE — BRIEF OP NOTE
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

## 2021-06-10 NOTE — PHYSICAL THERAPY NOTE
PHYSICAL THERAPY EVALUATION - INPATIENT     Room Number: 375/375-A  Evaluation Date: 6/10/2021  Type of Evaluation: Initial  Physician Order: PT Eval and Treat    Presenting Problem: S/p C3-C4 laminectomy w/ resection of intradural tumor  Reason for mechanics;Repositioning    COGNITION  · Overall Cognitive Status:  WFL - within functional limits    RANGE OF MOTION AND STRENGTH ASSESSMENT  Upper extremity ROM and strength -see OT evaluation    Lower extremity ROM is within functional limits     Lower e sitting. Pt instructed in proper hand placement and completed sit<>stand w/ min a of 1. Pt felt that she needed to urinate, so completed 3 steps to bedside recliner and had pt sit down w/ min a while purewick was re-attached.   Did not note any significan Home    PLAN  PT Treatment Plan: Bed mobility; Body mechanics; Patient education; Family education;Gait training;Strengthening;Stair training;Transfer training;Balance training  Rehab Potential : Good  Frequency (Obs): Daily  Number of Visits to Meet Melodie

## 2021-06-10 NOTE — PAYOR COMM NOTE
--------------  CONTINUED STAY REVIEW    Elian Lutz MA [facility]  Subscriber #:  [id number]  Authorization Number: [id number]    Admit date: 6/9/21  Admit time:  1:10 PM    Admitting Physician: Lavenia Barthel, MD  Attending Physician:  Lavenia Barthel, standpoint  PT/OT consults  Pain medications as ordered  Medical management per hospitalist  DVT prophylaxis- SCDs TEDs  Decadron as ordered       MEDICATIONS ADMINISTERED IN LAST 1 DAY:  bupivacaine 0.25%-EPINEPHrine 1:200,000 PF (MARCAINE/EPINEPHRINE) in HCl (DILAUDID) 1 MG/ML injection 0.4 mg     Date Action Dose Route User    6/9/2021 2138 Given 0.4 mg Intravenous Fortunato Mckeon RN      HYDROmorphone HCl (DILAUDID) 1 MG/ML injection 0.2 mg     Date Action Dose Route User    6/10/2021 0415 Given 0.2 mg In User    6/10/2021 0412 New Bag 20 mEq Intravenous Jorge Ellington RN      0.9 % NaCl with KCl 20 mEq infusion     Date Action Dose Route User    6/10/2021 0800 Rate/Dose Verify 75 mL/hr Intravenous Rowan Guillen RN    6/10/2021 0600 Rate/Dose Verify 5000 units powder     Date Action Dose Route User    6/9/2021 1847 Given 5,000 Units Topical Josh Messina MD      Trimethobenzamide HCl Ruel Marking) injection 200 mg     Date Action Dose Route User    6/9/2021 2228 Given 200 mg Intramuscular (Right Vast

## 2021-06-10 NOTE — PLAN OF CARE
Pt received from PACU at 2250 via bed accompanied by PACU RN. Pt received drowsy but easily arousable to name being called. Pt QURESHI equally, following all commands. Pt denies any dizziness. Pt denies any SOB.  Does c/o posterior neck pain r/t surgery/incisio

## 2021-06-10 NOTE — ANESTHESIA PROCEDURE NOTES
Arterial Line  Performed by: Jewels Ge MD  Authorized by: Jewels Ge MD     General Information and Staff    Procedure Start:  6/9/2021 6:00 PM  Procedure End:  6/9/2021 6:05 PM  Anesthesiologist:  Jewels Ge MD  Performed By:  Sharla Anderson

## 2021-06-10 NOTE — CONSULTS
[address]   NEUROCRITICAL CARE   CONSULT NOTE    Admission date: 6/9/2021  Reason for Consult: Post op  Chief Complaint:Spinal mass  ________________________________________________________________    History     24 Hour Significant Ev Outpatient Medications   Medication Instructions   • acetaminophen (TYLENOL EXTRA STRENGTH) 500 mg, Oral, Every 6 hours PRN   • ASPERCREME W/LIDOCAINE 4 % External Cream 1 Application, Topical, 3 times daily, As directed   • Calcium Carbonate-Vitamin D (CA hematemesis, diarrhea, constipation, BRBPR, melena   : hematuria, dysuria, frequency, urgency  MSKL: pain, muscle weakness, joint pain, joint swelling, decreased ROM   SKIN: rash, pain, pruritis, lesions, lumps, skin breakdown   NEURO: headache, dizzines Radiology:    US INTRAOPERATIVE GUIDANCE (MHK=[id number])    Result Date: 6/9/2021  CONCLUSION:  Intraoperative images for operative control.     Dictated by (CST): Karl Sauer MD on 6/09/2021 at 8:19 PM     Finalized by (CST): Karl Sauer MD on [place]

## 2021-06-10 NOTE — CONSULTS
General Medicine Consult      Reason for consult: medical management    Consulted by: Dr. Kiran Pickett    PCP: Nahomi Tenorio MD      History of Present Illness: Patient is a 66year old female with HL, spinal stenosis, cervical tumor with radiculopathy is co lidocaine  1 Application Topical TID   • ergocalciferol  50,000 Units Oral Weekly   • multivitamin with minerals  1 tablet Oral Daily   • Pravastatin Sodium  20 mg Oral Daily   • Senna  17.2 mg Oral Nightly   • docusate sodium  100 mg Oral BID   • ceFAZoli sounds normal. Non distended    Extremities: Extremities normal, atraumatic, no  edema.    Skin: Skin color, texture, turgor normal. No visible rashes     Neurologic:  Psychiatric: No facial droop or dysarthria   appropriate affect,  memory intact     Diagn [phone]

## 2021-06-10 NOTE — PROGRESS NOTES
[facility]  Neurocritical Care APRN Progress Note    NAME: Madeline Jacobs - ROOM: 7682/6959-N - MRN: [id number] - Age: 66year old - :3/3/1950    History Of Present Illness:  Madeline Jacobs is a 66year old female with PMHx significan Supple, symmetrical, trachea midline, no carotid bruits, adenopathy, or JVD  Lungs: Clear to auscultation bilaterally, respirations unlabored  Heart: Regular rate and rhythm, S1 and S2 normal, no murmur, rub or gallop  Abdomen: Soft, non-tender, bowel soun 06/09/2021    ALKPHO 77 06/09/2021    BILT 0.3 06/09/2021    TP 6.5 06/09/2021    AST 23 06/09/2021    ALT 20 06/09/2021       Assessment/Plan:    #Indradural extra medullary tumor post resection, c3/c4 laminectomies   -K1yceig vascular checks  -SBP<150  -

## 2021-06-10 NOTE — ANESTHESIA PROCEDURE NOTES
Peripheral IV  Date/Time: 6/9/2021 6:06 PM  Inserted by: Alena Garrett MD    Placement  Needle size: 18 G  Laterality: right  Location: hand  Local anesthetic: none  Site prep: alcohol  Technique: anatomical landmarks  Attempts: 1

## 2021-06-10 NOTE — ANESTHESIA POSTPROCEDURE EVALUATION
[facility] Patient Status:  Inpatient   Age/Gender 66year old female MRN [id number]   Location 1310 [facility] Attending Angel Luna MD   Hosp Day # 0 PCP Rios Bernal MD       Anesthesia Post-op N stable    Dental Exam: Unchanged from Preop    Patient to be discharged from PACU when criteria met.

## 2021-06-10 NOTE — OCCUPATIONAL THERAPY NOTE
OCCUPATIONAL THERAPY EVALUATION - INPATIENT     Room Number: 375/375-A  Evaluation Date: 6/10/2021  Type of Evaluation: Initial  Presenting Problem: C3-4 laminectomy and resecton of intradural tumor resection 6/9    Physician Order: IP Consult to [facility] Prior Level of Function: lives with her . Independent with ADL. Enjoys flower gardening.     OBJECTIVE  Precautions: Spine;Bed/chair alarm  Fall Risk: High fall risk    WEIGHT BEARING RESTRICTION  Weight Bearing Restriction: None guard assist    Skilled Therapy Provided: Supine to sit min A. Educated the pt about neck protection principles. CGA to stand. Once standing, pt thought she had to urinate. After taking steps to the chair, brief was set-up.   CGA to stand, CGA static sta activities; Energy conservation/work simplification techniques;ADL training;Functional transfer training;UE strengthening/ROM; Patient/Family education;Patient/Family training  Rehab Potential : Good  Frequency (Obs): 3-5x/week  Number of Visits to Meet [facility]

## 2021-06-10 NOTE — PLAN OF CARE
Assumed care of pt at 0730. Pt resting in bed, VSS. NSR on tele, SBP normotensive. Neurologically intact (See complex neuro flowsheets for full assessments). Posterior neck incision site C/D/I.  Cardizem infusing this AM- turned off at 1100 and pt transitio

## 2021-06-10 NOTE — DIETARY NOTE
[address] [facility]     Admitting diagnosis:  Spinal cord compression (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 [G95.9, M54.12]  Intradural extramedullary spinal t

## 2021-06-11 VITALS
TEMPERATURE: 98 F | WEIGHT: 105.81 LBS | HEIGHT: 64 IN | DIASTOLIC BLOOD PRESSURE: 66 MMHG | HEART RATE: 77 BPM | BODY MASS INDEX: 18.06 KG/M2 | SYSTOLIC BLOOD PRESSURE: 120 MMHG | OXYGEN SATURATION: 93 % | RESPIRATION RATE: 16 BRPM

## 2021-06-11 RX ORDER — DEXAMETHASONE 4 MG/1
TABLET ORAL
Qty: 3 TABLET | Refills: 0 | Status: SHIPPED | OUTPATIENT
Start: 2021-06-11 | End: 2021-06-13

## 2021-06-11 RX ORDER — HYDROCODONE BITARTRATE AND ACETAMINOPHEN 10; 325 MG/1; MG/1
1-2 TABLET ORAL EVERY 4 HOURS PRN
Qty: 60 TABLET | Refills: 0 | Status: SHIPPED | OUTPATIENT
Start: 2021-06-11 | End: 2021-06-30

## 2021-06-11 RX ORDER — METOPROLOL SUCCINATE 25 MG/1
12.5 TABLET, EXTENDED RELEASE ORAL
Qty: 30 TABLET | Refills: 3 | Status: SHIPPED | OUTPATIENT
Start: 2021-06-12 | End: 2021-06-30

## 2021-06-11 RX ORDER — DEXAMETHASONE 4 MG/1
4 TABLET ORAL EVERY 12 HOURS SCHEDULED
Status: DISCONTINUED | OUTPATIENT
Start: 2021-06-11 | End: 2021-06-11

## 2021-06-11 RX ORDER — DEXAMETHASONE 4 MG/1
4 TABLET ORAL DAILY
Status: DISCONTINUED | OUTPATIENT
Start: 2021-06-12 | End: 2021-06-11

## 2021-06-11 RX ORDER — CYCLOBENZAPRINE HCL 10 MG
10 TABLET ORAL EVERY 6 HOURS PRN
Qty: 45 TABLET | Refills: 0 | Status: SHIPPED | OUTPATIENT
Start: 2021-06-11

## 2021-06-11 RX ORDER — ASPIRIN 81 MG/1
81 TABLET ORAL DAILY
Qty: 30 TABLET | Refills: 0 | Status: SHIPPED | OUTPATIENT
Start: 2021-06-14 | End: 2021-10-04

## 2021-06-11 NOTE — PROGRESS NOTES
[facility] Cardiology Progress Note - Ron Cisneros Patient Status:  Inpatient    3/3/1950 MRN [id number]   [facility] 3SW-A Attending Brent Davila MD   Hosp Day # 2 PCP Trevor Zee MD     Subjective:  Alison Jerez kg)  05/06/21 1054 : 103 lb (46.7 kg)  04/20/21 1503 : 105 lb (47.6 kg)      Tele: NSR    Physical Exam:    General: Alert and oriented x 3. No apparent distress. No respiratory or constitutional distress.   HEENT: Normocephalic, anicteric sclera, neck supp (DRISDOL/VITAMIN D2) cap 50,000 Units, 50,000 Units, Oral, Weekly  multivitamin with minerals (ADULT) tab 1 tablet, 1 tablet, Oral, Daily  Pravastatin Sodium (PRAVACHOL) tab 20 mg, 20 mg, Oral, Daily  0.9 % NaCl with KCl 20 mEq infusion, 75 mL/hr, Intraven deficits  HEENT: denies nasal congestion, sinus pain; hearing loss negative  Respiratory: denies shortness of breath, wheezing or cough   Cardiovascular:  See HPI  GI: denies nausea, vomiting,   Genital/: no dysuria,   Musculoskeletal: no joint complaint

## 2021-06-11 NOTE — PLAN OF CARE
Pt alert and oriented x4. RA//IS. Staples to R side of head IZABEL. Dermabond to posterior neck IZABEL. Denies n/t. Denies SOB or CP. Tele:NSR. VSS. Teds/SCDs bilaterally. Ankle pumps encouraged.  Pt declined ambulating this evening states she wanted to rest.

## 2021-06-11 NOTE — PLAN OF CARE
Written and verbal discharge instructions given to patient and family. They verbalize understanding. Patient discharged home via wheelchair with family in stable condition.

## 2021-06-11 NOTE — OCCUPATIONAL THERAPY NOTE
OCCUPATIONAL THERAPY TREATMENT NOTE - INPATIENT     Room Number: 375/375-A  Session: 1   Number of Visits to Meet Established Goals: 5    Presenting Problem: C3-4 laminectomy and resecton of intradural tumor resection 6/9    History related to current admi Score (AM-PAC Scale): 40.22  CMS Modifier (G-Code): CK    FUNCTIONAL TRANSFER ASSESSMENT  Supine to Sit : Supervision  Sit to Stand:  (SBA)    Skilled Therapy Provided:   UB dressing: NT  LB dressing:    Pants: NT   Underpants: SBA   Socks: NT   Shoes: NT supervision  Patient will perform toileting: with supervision     Functional Transfer Goals  Patient will transfer to toilet:  with supervision    Writer PPE: Surgical mask, goggles, and gloves worn.      Patient/family PPE: Mask not worn by pt, worn by spo

## 2021-06-11 NOTE — PROGRESS NOTES
D/w JOSEPH Rodriguez. Neurosurgery ok with asa 81 mg daily starting Monday 6/14/21. ordered    [facility] Hospitalist Progress Note     PCP: Day Mesa MD    CC:  Follow up    SUBJECTIVE:  Pain manageable. No cp/sob/n/v/f/c. +U, +flatus.   at bedside    OBJECTIV lidocaine  1 Application Topical TID   • ergocalciferol  50,000 Units Oral Weekly   • multivitamin with minerals  1 tablet Oral Daily   • Pravastatin Sodium  20 mg Oral Daily   • Senna  17.2 mg Oral Nightly   • docusate sodium  100 mg Oral BID     • dilTIA

## 2021-06-11 NOTE — PROGRESS NOTES
[facility]  Neurosurgery Progress Note    Sadia Boswell Patient Status:  Inpatient    3/3/1950 MRN [id number]   [facility] 3SW-A Attending Estella Penaloza MD   Hosp Day # 2 PCP Dorinda Patricia MD     Chief Complaint:  No chief comp

## 2021-06-11 NOTE — PHYSICAL THERAPY NOTE
PHYSICAL THERAPY SPINE TREATMENT NOTE - INPATIENT      Room Number: 375/375-A     Session: 1  Number of Visits to Meet Established Goals: 5    Presenting Problem: S/p C3-C4 laminectomy w/ resection of intradural tumor    Problem List  Active Problems:    S (AM-PAC Scale): 53.28   CMS Modifier (G-Code): CJ    FUNCTIONAL ABILITY STATUS  Gait Assessment   Gait Assistance: Supervision  Distance (ft): 300  Assistive Device: Rolling walker  Pattern: Shuffle  Stoop/Curb Assistance: Not tested       Skilled Therapy BSC at assistance level: modified independent      Goal #3 Patient is able to ambulate 150 feet with assist device: walker - rolling at assistance level: supervision      Goal #4 Pt will complete flight of stairs w/ use of railing and supervision assist.

## 2021-06-11 NOTE — DISCHARGE SUMMARY
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      History of Present Illness: Tirso Sharp is a(n) 70 year old female for 1 day. Qty: 3 tablet Refills: 0      CONTINUE these medications which have NOT CHANGED    Pravastatin Sodium 20 MG Oral Tab  Take 1 tablet (20 mg total) by mouth daily.   Qty: 30 tablet Refills: 6  Associated Diagnoses:Mixed hyperlipidemia    ASPERCREM asked to do so by your provider. We are working to limit the number of people in our waiting rooms and ask that patients do not bring anyone with them to their appointment unless clinically required.         Jul 22, 2021 10:30 AM CDT Post Op Visit with Maria Dolores

## 2021-06-15 NOTE — PAYOR COMM NOTE
--------------  DISCHARGE REVIEW    [employer] [facility]  Subscriber #:  [id number]  Authorization Number: [id number]    Admit date: 6/9/21  Admit time:   1:10 PM  Discharge Date: 6/11/2021  2:51 PM     Admitting Physician: Danita Beck MD  Attending Course: Pt presented for procedure as planned. Her surgery was noted to be without complication. She was admitted to the CNICU post op for close monitoring. On POD #1, she felt well with no new symptoms.   She was noted to be in Afib with RVR and cardiol Carbonate-Vitamin D (CALCIUM-D OR)  Take 1 tablet by mouth daily.       ERGOCALCIFEROL 1.25 MG (59894 UT) Oral Cap  TAKE 1 CAPSULE EVERY WEEK  Qty: 12 capsule Refills: 0      STOP taking these medications    acetaminophen 500 MG Oral Tab    Naproxen Sodium [place]  [address] [facility] [address]  [phone]           Patient Instructions: Activity: OOB, ambulate several times a day. Avoid prolonged immobility  Wound Care: Ok to shower, do not scrub incision.   Call with questions or concerns regarding

## 2021-06-16 ENCOUNTER — TELEPHONE (OUTPATIENT)
Dept: SURGERY | Facility: CLINIC | Age: 71
End: 2021-06-16

## 2021-06-16 NOTE — TELEPHONE ENCOUNTER
Patient had POSTERIOR CERVICAL 3 - CERVICAL 4 LAMINECTOMY WITH RESECTION OF INTRADURAL TUMOR surgery with Dr. Gavino Gamez on 6/9/2021    Conducted Post operative outreach call with patient.

## 2021-06-23 ENCOUNTER — OFFICE VISIT (OUTPATIENT)
Dept: SURGERY | Facility: CLINIC | Age: 71
End: 2021-06-23
Payer: MEDICARE

## 2021-06-23 VITALS
HEIGHT: 64 IN | SYSTOLIC BLOOD PRESSURE: 134 MMHG | WEIGHT: 105 LBS | DIASTOLIC BLOOD PRESSURE: 80 MMHG | BODY MASS INDEX: 17.93 KG/M2

## 2021-06-23 DIAGNOSIS — G95.20 SPINAL CORD COMPRESSION (HCC): ICD-10-CM

## 2021-06-23 DIAGNOSIS — M62.89 MUSCLE TIGHTNESS: ICD-10-CM

## 2021-06-23 DIAGNOSIS — Z98.890 POST-OPERATIVE STATE: ICD-10-CM

## 2021-06-23 DIAGNOSIS — Z98.890 HX OF CERVICAL SPINE SURGERY: Primary | ICD-10-CM

## 2021-06-23 PROCEDURE — 99024 POSTOP FOLLOW-UP VISIT: CPT | Performed by: PHYSICIAN ASSISTANT

## 2021-06-23 PROCEDURE — 3008F BODY MASS INDEX DOCD: CPT | Performed by: PHYSICIAN ASSISTANT

## 2021-06-23 PROCEDURE — 3075F SYST BP GE 130 - 139MM HG: CPT | Performed by: PHYSICIAN ASSISTANT

## 2021-06-23 PROCEDURE — 3079F DIAST BP 80-89 MM HG: CPT | Performed by: PHYSICIAN ASSISTANT

## 2021-06-23 NOTE — PROGRESS NOTES
Patient: Susan Paulino  Medical Record Number: [id number]  YOB: 1950  PCP: Beth Barone MD    Reason for visit: Cervical follow up, 2-week postop visit, status post intradural tumor cervical resection  Patient denied    H • HYDROcodone-acetaminophen  MG Oral Tab Take 1-2 tablets by mouth every 4 (four) hours as needed for Pain. 60 tablet 0   • cyclobenzaprine 10 MG Oral Tab Take 1 tablet (10 mg total) by mouth every 6 (six) hours as needed for Muscle spasms.  45 tabl ASSESSMENT and PLAN:  (Z98.890) Hx of cervical spine surgery  (primary encounter diagnosis)    (G95.20) Spinal cord compression (HCC)    (M62.89) Muscle tightness    (Z98.890) Post-operative state    PLAN:   1. Medication: None prescribed  2.  Imaging:

## 2021-06-23 NOTE — PROGRESS NOTES
Pt refuses our  services, is speaking for herself and her  is here to help    Feeling ok, sometimes dizzy  Eating well  Pain meds are helping control pain  Taking one at night currently  Walking is ok, a little painful when laying down  N

## 2021-06-29 ENCOUNTER — TELEPHONE (OUTPATIENT)
Dept: SURGERY | Facility: CLINIC | Age: 71
End: 2021-06-29

## 2021-06-29 DIAGNOSIS — D49.2 CERVICAL SPINE TUMOR: Primary | ICD-10-CM

## 2021-06-29 DIAGNOSIS — Z98.890 HISTORY OF CERVICAL SPINAL SURGERY: ICD-10-CM

## 2021-06-29 NOTE — TELEPHONE ENCOUNTER
Please advise, reviewed pathology at conference. As discussed at last weeks visit, bronchogenic cyst, benign. Conference agrees    Recommend MRI cervical spine 3 months from surgery. Will place the order now.  Patient should follow up as scheduled w

## 2021-06-30 NOTE — TELEPHONE ENCOUNTER
Called pt back using Language Jono Ervin #[id number]    Called pt to inform of Radha Valenzuela below note. No answer. When attempting to leave VM call was dropped.  Will try and contact pt again at later time negative - no fever

## 2021-07-06 PROBLEM — E46 PROTEIN-CALORIE MALNUTRITION, UNSPECIFIED SEVERITY (HCC): Status: ACTIVE | Noted: 2021-07-06

## 2021-07-07 PROBLEM — Z01.818 PREOPERATIVE EXAMINATION: Status: RESOLVED | Noted: 2021-05-07 | Resolved: 2021-07-07

## 2021-07-08 NOTE — TELEPHONE ENCOUNTER
Emirati speaking nurse called patient's number   Spoke to spouse ( Verified on verbal release)  Informed them of message below from [place], [place]     They were very appriciative

## 2021-07-22 ENCOUNTER — OFFICE VISIT (OUTPATIENT)
Dept: SURGERY | Facility: CLINIC | Age: 71
End: 2021-07-22
Payer: MEDICARE

## 2021-07-22 VITALS
HEIGHT: 64 IN | DIASTOLIC BLOOD PRESSURE: 68 MMHG | WEIGHT: 102 LBS | SYSTOLIC BLOOD PRESSURE: 110 MMHG | BODY MASS INDEX: 17.42 KG/M2

## 2021-07-22 DIAGNOSIS — Z98.890 HISTORY OF CERVICAL SPINAL SURGERY: ICD-10-CM

## 2021-07-22 DIAGNOSIS — D49.2 CERVICAL SPINE TUMOR: Primary | ICD-10-CM

## 2021-07-22 PROCEDURE — 99024 POSTOP FOLLOW-UP VISIT: CPT | Performed by: NEUROLOGICAL SURGERY

## 2021-07-22 NOTE — PROGRESS NOTES
Pt would like  to interpret does not want to use our     Feeling ok  Little pain, better than it was before sx  Feeling pretty stiff  Recently had holter monitor off so has not done any PT

## 2021-07-22 NOTE — PROGRESS NOTES
Neurosurgery Clinic Visit  2021    Timo Roldan PCP:  Lissett Knott MD    3/3/1950 MRN [id number]     HISTORY OF PRESENT ILLNESS:  Timo Roldan is a(n) 66year old female here for follow-up status post intradural cyst reduction in her cervical

## 2021-08-30 PROBLEM — Q06.8: Status: RESOLVED | Noted: 2020-12-18 | Resolved: 2021-08-30

## 2021-08-30 PROBLEM — R10.11 RIGHT UPPER QUADRANT PAIN: Status: RESOLVED | Noted: 2021-01-14 | Resolved: 2021-08-30

## 2021-08-30 PROBLEM — G95.20 SPINAL CORD COMPRESSION (HCC): Status: RESOLVED | Noted: 2020-09-23 | Resolved: 2021-08-30

## 2021-09-07 ENCOUNTER — OFFICE VISIT (OUTPATIENT)
Dept: SURGERY | Facility: CLINIC | Age: 71
End: 2021-09-07

## 2021-09-07 VITALS
BODY MASS INDEX: 17.66 KG/M2 | HEIGHT: 65 IN | SYSTOLIC BLOOD PRESSURE: 142 MMHG | WEIGHT: 106 LBS | DIASTOLIC BLOOD PRESSURE: 82 MMHG

## 2021-09-07 DIAGNOSIS — D49.2 CERVICAL SPINE TUMOR: Primary | ICD-10-CM

## 2021-09-07 PROCEDURE — 3079F DIAST BP 80-89 MM HG: CPT | Performed by: NEUROLOGICAL SURGERY

## 2021-09-07 PROCEDURE — 99024 POSTOP FOLLOW-UP VISIT: CPT | Performed by: NEUROLOGICAL SURGERY

## 2021-09-07 PROCEDURE — 3008F BODY MASS INDEX DOCD: CPT | Performed by: NEUROLOGICAL SURGERY

## 2021-09-07 PROCEDURE — 3077F SYST BP >= 140 MM HG: CPT | Performed by: NEUROLOGICAL SURGERY

## 2021-09-07 NOTE — PROGRESS NOTES
Neurosurgery Clinic Visit  2021    Kirsten Guerrero PCP:  Silva Montague MD    3/3/1950 MRN [id number]     HISTORY OF PRESENT ILLNESS:  Kirsten Guerrero is a(n) 66year old female here for follow-up status post resection of bronchogenic cyst in her cervi

## 2021-09-07 NOTE — PROGRESS NOTES
Pt here for 6wk post op sx 5/26/21    Pain: 7/10  Location: neck   Radiating: bilateral shoulders   Quality: last week had sharp electric shock sensation, stabbibng   Onset quality: acute  Duration: last week  Precipitating factors: post op   Numbness/ting

## 2021-12-16 ENCOUNTER — OFFICE VISIT (OUTPATIENT)
Dept: SURGERY | Facility: CLINIC | Age: 71
End: 2021-12-16
Payer: MEDICARE

## 2021-12-16 VITALS
HEART RATE: 70 BPM | SYSTOLIC BLOOD PRESSURE: 110 MMHG | WEIGHT: 99 LBS | HEIGHT: 65 IN | DIASTOLIC BLOOD PRESSURE: 70 MMHG | BODY MASS INDEX: 16.5 KG/M2

## 2021-12-16 DIAGNOSIS — D49.2 CERVICAL SPINE TUMOR: Primary | ICD-10-CM

## 2021-12-16 DIAGNOSIS — Z98.890 HISTORY OF CERVICAL SPINAL SURGERY: ICD-10-CM

## 2021-12-16 PROCEDURE — 3074F SYST BP LT 130 MM HG: CPT | Performed by: NEUROLOGICAL SURGERY

## 2021-12-16 PROCEDURE — 3078F DIAST BP <80 MM HG: CPT | Performed by: NEUROLOGICAL SURGERY

## 2021-12-16 PROCEDURE — 99212 OFFICE O/P EST SF 10 MIN: CPT | Performed by: NEUROLOGICAL SURGERY

## 2021-12-16 PROCEDURE — 3008F BODY MASS INDEX DOCD: CPT | Performed by: NEUROLOGICAL SURGERY

## 2021-12-16 NOTE — PROGRESS NOTES
Pt is here regarding: 3 month follow up   POSTERIOR CERVICAL 3 - CERVICAL 4 LAMINECTOMY WITH RESECTION OF INTRADURAL TUMOR, NEUROMONITORING, AND ALL INDICATED PROCEDURES.  N/A General   INTRAOPERATIVE NEURO MONITORING           Pain level at this moment:  0

## 2021-12-16 NOTE — PROGRESS NOTES
Neurosurgery Clinic Visit  2021    Wolf Yang PCP:  Charlotte Schmid MD    3/3/1950 MRN [id number]     HISTORY OF PRESENT ILLNESS:  Wolf Yang is a(n) 66year old female here for follow-up status post resection of bronchogenic cyst in her cer

## 2022-03-16 PROBLEM — D68.69 SECONDARY HYPERCOAGULABLE STATE (HCC): Status: ACTIVE | Noted: 2022-03-16

## 2022-03-16 PROBLEM — I48.91 ATRIAL FIBRILLATION, UNSPECIFIED TYPE (HCC): Status: ACTIVE | Noted: 2022-03-16

## 2022-04-08 PROBLEM — E46 PROTEIN-CALORIE MALNUTRITION, UNSPECIFIED SEVERITY (HCC): Status: RESOLVED | Noted: 2021-07-06 | Resolved: 2022-04-08

## 2022-04-08 PROBLEM — Z13.29 ENCOUNTER FOR SCREENING FOR ENDOCRINE DISORDER: Status: RESOLVED | Noted: 2018-01-05 | Resolved: 2022-04-08

## 2022-04-08 PROBLEM — K40.90 RIGHT INGUINAL HERNIA: Status: RESOLVED | Noted: 2019-03-06 | Resolved: 2022-04-08

## 2022-04-08 PROBLEM — Z00.00 MEDICARE ANNUAL WELLNESS VISIT, INITIAL: Status: RESOLVED | Noted: 2018-01-05 | Resolved: 2022-04-08

## 2022-06-16 ENCOUNTER — TELEPHONE (OUTPATIENT)
Dept: SURGERY | Facility: CLINIC | Age: 72
End: 2022-06-16

## 2022-06-16 ENCOUNTER — OFFICE VISIT (OUTPATIENT)
Dept: SURGERY | Facility: CLINIC | Age: 72
End: 2022-06-16
Payer: MEDICARE

## 2022-06-16 VITALS — HEART RATE: 60 BPM | DIASTOLIC BLOOD PRESSURE: 78 MMHG | SYSTOLIC BLOOD PRESSURE: 160 MMHG

## 2022-06-16 DIAGNOSIS — S13.4XXA WHIPLASH INJURIES, INITIAL ENCOUNTER: ICD-10-CM

## 2022-06-16 DIAGNOSIS — Z98.890 HISTORY OF CERVICAL SPINAL SURGERY: Primary | ICD-10-CM

## 2022-06-16 PROCEDURE — 1125F AMNT PAIN NOTED PAIN PRSNT: CPT | Performed by: NEUROLOGICAL SURGERY

## 2022-06-16 PROCEDURE — 3078F DIAST BP <80 MM HG: CPT | Performed by: NEUROLOGICAL SURGERY

## 2022-06-16 PROCEDURE — 99212 OFFICE O/P EST SF 10 MIN: CPT | Performed by: NEUROLOGICAL SURGERY

## 2022-06-16 PROCEDURE — 3077F SYST BP >= 140 MM HG: CPT | Performed by: NEUROLOGICAL SURGERY

## 2022-06-16 RX ORDER — METHOCARBAMOL 500 MG/1
500 TABLET, FILM COATED ORAL 3 TIMES DAILY PRN
Qty: 60 TABLET | Refills: 1 | Status: SHIPPED | OUTPATIENT
Start: 2022-06-16
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
## 2022-06-16 NOTE — TELEPHONE ENCOUNTER
Pt brought disc of MRI spine cervical to be uploaded to PACS. Disc uploaded to PACS and returned to pt.

## 2022-08-18 ENCOUNTER — OFFICE VISIT (OUTPATIENT)
Dept: SURGERY | Facility: CLINIC | Age: 72
End: 2022-08-18
Payer: MEDICARE

## 2022-08-18 VITALS
HEIGHT: 65 IN | WEIGHT: 100 LBS | BODY MASS INDEX: 16.66 KG/M2 | SYSTOLIC BLOOD PRESSURE: 150 MMHG | HEART RATE: 60 BPM | DIASTOLIC BLOOD PRESSURE: 60 MMHG

## 2022-08-18 DIAGNOSIS — S13.4XXA WHIPLASH INJURIES, INITIAL ENCOUNTER: Primary | ICD-10-CM

## 2022-08-18 DIAGNOSIS — D49.7 INTRADURAL EXTRAMEDULLARY SPINAL TUMOR: ICD-10-CM

## 2022-08-18 PROCEDURE — 3077F SYST BP >= 140 MM HG: CPT | Performed by: NEUROLOGICAL SURGERY

## 2022-08-18 PROCEDURE — 1125F AMNT PAIN NOTED PAIN PRSNT: CPT | Performed by: NEUROLOGICAL SURGERY

## 2022-08-18 PROCEDURE — 99212 OFFICE O/P EST SF 10 MIN: CPT | Performed by: NEUROLOGICAL SURGERY

## 2022-08-18 PROCEDURE — 3008F BODY MASS INDEX DOCD: CPT | Performed by: NEUROLOGICAL SURGERY

## 2022-08-18 PROCEDURE — 3078F DIAST BP <80 MM HG: CPT | Performed by: NEUROLOGICAL SURGERY

## 2022-08-18 NOTE — PROGRESS NOTES
States she is doing good, sometimes she will have pain by her shoulders       Started PT last week 2 to 3 times a weeks.  It helping her a lot

## 2023-03-30 ENCOUNTER — TELEPHONE (OUTPATIENT)
Dept: SURGERY | Facility: CLINIC | Age: 73
End: 2023-03-30

## 2023-03-30 NOTE — TELEPHONE ENCOUNTER
Spoke to Carlos Alberto at Dr. Mandujano Call office to request referral authorization for pt states referral is still in pending status and not authorized just yet once done will fax referral, fax number provided.  Pt has been informed referral is still in pending status as well

## 2023-04-11 ENCOUNTER — OFFICE VISIT (OUTPATIENT)
Dept: SURGERY | Facility: CLINIC | Age: 73
End: 2023-04-11
Payer: MEDICARE

## 2023-04-11 ENCOUNTER — TELEPHONE (OUTPATIENT)
Dept: SURGERY | Facility: CLINIC | Age: 73
End: 2023-04-11

## 2023-04-11 VITALS
DIASTOLIC BLOOD PRESSURE: 62 MMHG | HEIGHT: 65 IN | WEIGHT: 100 LBS | BODY MASS INDEX: 16.66 KG/M2 | HEART RATE: 75 BPM | SYSTOLIC BLOOD PRESSURE: 140 MMHG

## 2023-04-11 DIAGNOSIS — Z98.890 HISTORY OF CERVICAL SPINAL SURGERY: ICD-10-CM

## 2023-04-11 DIAGNOSIS — Z98.890 HX OF CERVICAL SPINE SURGERY: Primary | ICD-10-CM

## 2023-04-11 DIAGNOSIS — Q06.8: ICD-10-CM

## 2023-04-11 DIAGNOSIS — S13.4XXA WHIPLASH INJURIES, INITIAL ENCOUNTER: ICD-10-CM

## 2023-04-11 PROBLEM — D49.2 CERVICAL SPINE TUMOR: Status: ACTIVE | Noted: 2023-04-11

## 2023-04-11 PROBLEM — M47.812 CERVICAL SPONDYLOSIS: Status: ACTIVE | Noted: 2022-07-20

## 2023-04-11 PROBLEM — H61.23 BILATERAL IMPACTED CERUMEN: Status: ACTIVE | Noted: 2022-08-30

## 2023-04-11 PROBLEM — R07.9 CHEST PAIN: Status: ACTIVE | Noted: 2022-08-30

## 2023-04-11 PROBLEM — E55.9 VITAMIN D DEFICIENCY: Status: ACTIVE | Noted: 2022-08-30

## 2023-04-11 PROCEDURE — 99212 OFFICE O/P EST SF 10 MIN: CPT | Performed by: NEUROLOGICAL SURGERY

## 2023-04-11 PROCEDURE — 3077F SYST BP >= 140 MM HG: CPT | Performed by: NEUROLOGICAL SURGERY

## 2023-04-11 PROCEDURE — 3078F DIAST BP <80 MM HG: CPT | Performed by: NEUROLOGICAL SURGERY

## 2023-04-11 PROCEDURE — 3008F BODY MASS INDEX DOCD: CPT | Performed by: NEUROLOGICAL SURGERY

## 2023-04-11 RX ORDER — METHOCARBAMOL 500 MG/1
500 TABLET, FILM COATED ORAL 3 TIMES DAILY PRN
Qty: 60 TABLET | Refills: 1 | Status: SHIPPED | OUTPATIENT
Start: 2023-04-11

## 2023-04-11 NOTE — PROGRESS NOTES
Established patient:  Reason for follow up: review imaging     Numeric Rating Scale: (No Pain) 0  to  10 (Worst Pain)        Pain at Present:  8/10       Distribution of Pain:    bilateral    Most recent treatments for Current Pain Condition:   Physical Therapy and Surgery  Response to treatment: some relief    New imaging or testing since your last office visit:  Uploaded disc

## 2023-04-11 NOTE — TELEPHONE ENCOUNTER
Pt brought in imaging disc to upload and has been successfully uploaded into PACS;MRI Spine Cervical Dos-3/22/23; Disc returned to pt

## (undated) DEVICE — GOWN,SIRUS,FABRIC-REINFORCED,X-LARGE: Brand: MEDLINE

## (undated) DEVICE — Device: Brand: PLUMEPEN

## (undated) DEVICE — C-ARM: Brand: UNBRANDED

## (undated) DEVICE — MAYFIELD® DISPOSABLE ADULT SKULL PIN (PLASTIC BASE): Brand: MAYFIELD®

## (undated) DEVICE — MARKER SKIN PREP RESIST STRL

## (undated) DEVICE — LAMINECTOMY CDS: Brand: MEDLINE INDUSTRIES, INC.

## (undated) DEVICE — EXOFIN TISSUE ADHESIVE 1.0ML

## (undated) DEVICE — SUTURE NUROLON 4-0 TF

## (undated) DEVICE — GOWN SURG AERO CHROME XXL

## (undated) DEVICE — SCD SLEEVE KNEE HI BLEND

## (undated) DEVICE — CODMAN® SURGICAL PATTIES 1/4" X 1/4" (0.64CM X 0.64CM): Brand: CODMAN®

## (undated) DEVICE — SOL  .9 1000ML BTL

## (undated) DEVICE — CATH IV 14G X 5-1/4 ANGIO

## (undated) DEVICE — NON-ADHERENT PAD PREPACK: Brand: TELFA

## (undated) DEVICE — SUTURE VICRYL 3-0 RB-1

## (undated) DEVICE — SUTURE VICRYL 0 CT-1

## (undated) DEVICE — Device: Brand: INTELLICART™

## (undated) DEVICE — SUTURE VICRYL 2-0 CT-2

## (undated) DEVICE — PROXIMATE SKIN STAPLERS (35 WIDE) CONTAINS 35 STAINLESS STEEL STAPLES (FIXED HEAD): Brand: PROXIMATE

## (undated) DEVICE — ALCOHOL 70% 4 OZ

## (undated) DEVICE — LIGHT HANDLE

## (undated) DEVICE — STERILE SYNTHETIC POLYISOPRENE POWDER-FREE SURGICAL GLOVES WITH HYDROGEL COATING: Brand: PROTEXIS

## (undated) DEVICE — MICRO COVER: Brand: UNBRANDED

## (undated) NOTE — LETTER
DECLINATION FORM  [address] provides interpreters for patients who are deaf, hearing impaired, or have Limited [place] Proficiency in order to ensure these patients an equal opportunity to benefit from intranet  *For all other languages, please complete this form with the assistance of a telephone .       confirmation number: ___________________________    Patient Name: Jonathan Quigley     : 3/3/1950    Page 1 of 1     Printed: @DA

## (undated) NOTE — LETTER
Jeni Bergman [address]  [address] [facility] [address], [address] [facility]  Authorization for Surgical Operation and Procedure     Date:___________                                                                                                         Time:__________ result of receiving a blood transfusion and/or blood products.   The following are some, but not all, of the potential risks that can occur: fever and allergic reactions, hemolytic reactions, transmission of diseases such as Hepatitis, AIDS and Cytomegalovi explicitly stated by me (or a person authorized to consent on my behalf). The surgeon or my attending physician will determine when the applicable recovery period ends for purposes of reinstating the DNAR order.   10. Patients having a sterilization procedu Ltd.    2. As the patient asking for anesthesia services, I agree to:  a. Allow the anesthesiologist (anesthesia doctor) to give me medicine and do additional procedures as necessary.  Some examples are: Starting or using an “IV” to give me medicine, fluids irregular heart rhythms and nerve injury. 7. Regional Anesthesia (“spinal”, “epidural”, & “nerve blocks”): I understand that rare but potential complications include headache, bleeding, infection, seizure, irregular heart rhythms, and nerve injury.     I

## (undated) NOTE — LETTER
21  RE: Errol Francisco J     : 3/3/1950    Dear Dr. Courtney Moreno,    This letter is to inform you that your patient is being scheduled for surgery with Dr. Christine Rosa on 21 at [facility]. We have asked the patient to contact your office to schedule